# Patient Record
Sex: FEMALE | Race: WHITE
[De-identification: names, ages, dates, MRNs, and addresses within clinical notes are randomized per-mention and may not be internally consistent; named-entity substitution may affect disease eponyms.]

---

## 2020-12-29 ENCOUNTER — HOSPITAL ENCOUNTER (OUTPATIENT)
Dept: HOSPITAL 95 - ORSCSDS | Age: 80
Discharge: HOME | End: 2020-12-29
Attending: ORTHOPAEDIC SURGERY
Payer: MEDICARE

## 2020-12-29 VITALS — BODY MASS INDEX: 34.69 KG/M2 | HEIGHT: 62.99 IN | WEIGHT: 195.77 LBS

## 2020-12-29 DIAGNOSIS — Z79.899: ICD-10-CM

## 2020-12-29 DIAGNOSIS — E66.9: ICD-10-CM

## 2020-12-29 DIAGNOSIS — M75.121: Primary | ICD-10-CM

## 2020-12-29 DIAGNOSIS — M75.21: ICD-10-CM

## 2020-12-29 DIAGNOSIS — M75.41: ICD-10-CM

## 2020-12-29 DIAGNOSIS — I10: ICD-10-CM

## 2020-12-29 PROCEDURE — C1713 ANCHOR/SCREW BN/BN,TIS/BN: HCPCS

## 2020-12-29 PROCEDURE — 0LS34ZZ REPOSITION RIGHT UPPER ARM TENDON, PERCUTANEOUS ENDOSCOPIC APPROACH: ICD-10-PCS | Performed by: ORTHOPAEDIC SURGERY

## 2020-12-29 PROCEDURE — 0LQ14ZZ REPAIR RIGHT SHOULDER TENDON, PERCUTANEOUS ENDOSCOPIC APPROACH: ICD-10-PCS | Performed by: ORTHOPAEDIC SURGERY

## 2020-12-29 PROCEDURE — 0RNJ4ZZ RELEASE RIGHT SHOULDER JOINT, PERCUTANEOUS ENDOSCOPIC APPROACH: ICD-10-PCS | Performed by: ORTHOPAEDIC SURGERY

## 2020-12-29 NOTE — NUR
12/29/20 1404 [facility]
PT UP VIA WC TO BATHROOM WITH SBA. IV PATENT. PT DENIES PAIN. ICE PACK
TO RIGHT SHOULDER. PT TOELRATING PO INTAKE OF COOKIES AND JUICE.
DENIES NAUSEA. EDUCATED ON INCENTIVE SPIROMETER AS PT'S O2 SATS RANGE
FROM 91 - 94 % ON ROOM AIR. PT COUGHING BUT SETTLES EASILY. DRESSING
C/D/I

## 2020-12-29 NOTE — NUR
12/29/20 1139 [facility] PRE OP TEACHING. VSS. IV AT TKO. ALL QUESTIONS ASKED AND
ANSWERED. PT RESTING IN BED

## 2025-02-24 NOTE — NUR
SHIFT SUMMARY
WHILE SHE WASN'T ABLE TO WORK w/ PT, WAS STILL ABLE TO GET UP TO BATHROOM &
SIT UP IN CHAIR. EATING, DRINKING, VOIDED. PAIN WELL CONTROLLED. SURG SITE
WNL.

## 2025-02-24 NOTE — NUR
0735
PT TO [facility] AMBULATORY, CONFIRMED SURGERY AND SURGEON. LUNGS COARSE IN BASES W/
FAINT WHEEZING AND PRODUCTIVE COUGH. DR HAMMOND AND DR CARRASCO AWARE, DUONEB
ORDERED AND GIVEN. LUNGS SLIGHTLY CLEARER. PRE OP TEACHING DONE,  AT
BEDSIDE

## 2025-02-25 NOTE — NUR
SHIFT SUMMARY
POD 1 L TKA. NO ACUTE CHANGES OVERNIGHT. VSS c PERSISTENT COUGH, O2 SAT >90%.
TOLERATING ORALS, DENIES NAUSEA. AQUACEL & ACE WRAP C/D/I c POLAR PACK IN
USE. PT REPORTS PAIN TOLERABLE, MEDICATED PER EMAR. VOIDING. PT DRESSED,
RESETING IN CHAIR c LEs ELEVATED. ANTICIPATED TO WORK c PHYSCIAL THERAPY THEN
DISCHARGE HOME LATER TODAY. CALL LIGHT IN REACH, WILL REPORT TO DAY RN.